# Patient Record
Sex: FEMALE | Race: OTHER | HISPANIC OR LATINO
[De-identification: names, ages, dates, MRNs, and addresses within clinical notes are randomized per-mention and may not be internally consistent; named-entity substitution may affect disease eponyms.]

---

## 2023-04-11 PROBLEM — Z00.00 ENCOUNTER FOR PREVENTIVE HEALTH EXAMINATION: Status: ACTIVE | Noted: 2023-04-11

## 2023-04-21 ENCOUNTER — APPOINTMENT (OUTPATIENT)
Dept: ENDOCRINOLOGY | Facility: CLINIC | Age: 58
End: 2023-04-21

## 2023-08-11 ENCOUNTER — APPOINTMENT (OUTPATIENT)
Dept: ENDOCRINOLOGY | Facility: CLINIC | Age: 58
End: 2023-08-11

## 2023-09-15 ENCOUNTER — APPOINTMENT (OUTPATIENT)
Dept: ENDOCRINOLOGY | Facility: CLINIC | Age: 58
End: 2023-09-15

## 2023-11-19 ENCOUNTER — NON-APPOINTMENT (OUTPATIENT)
Age: 58
End: 2023-11-19

## 2023-11-20 ENCOUNTER — APPOINTMENT (OUTPATIENT)
Dept: ENDOCRINOLOGY | Facility: CLINIC | Age: 58
End: 2023-11-20
Payer: MEDICAID

## 2023-11-20 VITALS
WEIGHT: 145 LBS | HEIGHT: 66 IN | SYSTOLIC BLOOD PRESSURE: 91 MMHG | HEART RATE: 100 BPM | BODY MASS INDEX: 23.3 KG/M2 | DIASTOLIC BLOOD PRESSURE: 56 MMHG | OXYGEN SATURATION: 100 %

## 2023-11-20 DIAGNOSIS — N76.0 ACUTE VAGINITIS: ICD-10-CM

## 2023-11-20 LAB — GLUCOSE BLDC GLUCOMTR-MCNC: 67

## 2023-11-20 PROCEDURE — 99205 OFFICE O/P NEW HI 60 MIN: CPT | Mod: 25

## 2023-11-20 PROCEDURE — 82962 GLUCOSE BLOOD TEST: CPT

## 2023-11-20 PROCEDURE — T1013A: CUSTOM

## 2023-11-20 RX ORDER — EMPAGLIFLOZIN 10 MG/1
10 TABLET, FILM COATED ORAL
Refills: 0 | Status: DISCONTINUED | COMMUNITY
End: 2023-11-20

## 2023-11-27 ENCOUNTER — LABORATORY RESULT (OUTPATIENT)
Age: 58
End: 2023-11-27

## 2023-11-30 LAB
CHOLEST SERPL-MCNC: 175 MG/DL
HDLC SERPL-MCNC: 56 MG/DL
LDLC SERPL CALC-MCNC: 98 MG/DL
NONHDLC SERPL-MCNC: 119 MG/DL
TRIGL SERPL-MCNC: 116 MG/DL

## 2023-12-04 ENCOUNTER — APPOINTMENT (OUTPATIENT)
Dept: ENDOCRINOLOGY | Facility: CLINIC | Age: 58
End: 2023-12-04
Payer: MEDICAID

## 2023-12-04 DIAGNOSIS — D17.1 BENIGN LIPOMATOUS NEOPLASM OF SKIN AND SUBCUTANEOUS TISSUE OF TRUNK: ICD-10-CM

## 2023-12-04 PROCEDURE — T1013A: CUSTOM

## 2023-12-04 PROCEDURE — 95251 CONT GLUC MNTR ANALYSIS I&R: CPT

## 2023-12-04 PROCEDURE — 99214 OFFICE O/P EST MOD 30 MIN: CPT | Mod: 25

## 2023-12-04 RX ORDER — FLUCONAZOLE 150 MG/1
150 TABLET ORAL
Qty: 1 | Refills: 0 | Status: DISCONTINUED | COMMUNITY
Start: 2023-11-20 | End: 2023-12-04

## 2023-12-14 ENCOUNTER — APPOINTMENT (OUTPATIENT)
Dept: ENDOCRINOLOGY | Facility: CLINIC | Age: 58
End: 2023-12-14

## 2023-12-18 ENCOUNTER — APPOINTMENT (OUTPATIENT)
Dept: ENDOCRINOLOGY | Facility: CLINIC | Age: 58
End: 2023-12-18
Payer: MEDICAID

## 2023-12-18 LAB — GLUCOSE BLDC GLUCOMTR-MCNC: 67

## 2023-12-18 PROCEDURE — 82962 GLUCOSE BLOOD TEST: CPT

## 2023-12-27 ENCOUNTER — APPOINTMENT (OUTPATIENT)
Dept: ENDOCRINOLOGY | Facility: CLINIC | Age: 58
End: 2023-12-27

## 2024-01-23 ENCOUNTER — NON-APPOINTMENT (OUTPATIENT)
Age: 59
End: 2024-01-23

## 2024-01-23 ENCOUNTER — APPOINTMENT (OUTPATIENT)
Dept: CARDIOLOGY | Facility: CLINIC | Age: 59
End: 2024-01-23
Payer: COMMERCIAL

## 2024-01-23 VITALS
WEIGHT: 152 LBS | HEART RATE: 82 BPM | BODY MASS INDEX: 24.53 KG/M2 | DIASTOLIC BLOOD PRESSURE: 64 MMHG | SYSTOLIC BLOOD PRESSURE: 117 MMHG | OXYGEN SATURATION: 99 %

## 2024-01-23 DIAGNOSIS — R00.2 PALPITATIONS: ICD-10-CM

## 2024-01-23 DIAGNOSIS — G62.9 POLYNEUROPATHY, UNSPECIFIED: ICD-10-CM

## 2024-01-23 PROCEDURE — 93000 ELECTROCARDIOGRAM COMPLETE: CPT

## 2024-01-23 PROCEDURE — 99204 OFFICE O/P NEW MOD 45 MIN: CPT | Mod: 25

## 2024-01-23 NOTE — HISTORY OF PRESENT ILLNESS
[FreeTextEntry1] : Patient denies any history of myocardial infarction or congestive heart failure or chest pain syndrome  Diabetes dx'ed 6 ya, treated but still uncontrolled  About 15 days ago, she developed sudden palpitations. lasting < 1 minute, w/o associated symptoms Had 2 more episodes following that first one but has not had any in the last week and a half  No CP, SOB No dizziness No HANS, PND or orthopnea  She doesn't sleep  well -. has been for years She doens't snore

## 2024-01-23 NOTE — REVIEW OF SYSTEMS
[Numbness (Hypoesthesia)] : numbness [Tingling (Paresthesia)] : tingling [Negative] : Heme/Lymph [FreeTextEntry5] : See HPI [FreeTextEntry2] : Sleeping difficulty [de-identified] : burning in feet

## 2024-01-23 NOTE — PHYSICAL EXAM
[Well Nourished] : well nourished [No Acute Distress] : no acute distress [Well Developed] : well developed [Normal Conjunctiva] : normal conjunctiva [Normal Venous Pressure] : normal venous pressure [No Carotid Bruit] : no carotid bruit [Normal S1, S2] : normal S1, S2 [No Murmur] : no murmur [No Rub] : no rub [No Gallop] : no gallop [Clear Lung Fields] : clear lung fields [Good Air Entry] : good air entry [Soft] : abdomen soft [No Respiratory Distress] : no respiratory distress  [No Masses/organomegaly] : no masses/organomegaly [Non Tender] : non-tender [Normal Bowel Sounds] : normal bowel sounds [Normal Gait] : normal gait [No Edema] : no edema [No Cyanosis] : no cyanosis [No Varicosities] : no varicosities [No Clubbing] : no clubbing [No Rash] : no rash [No Skin Lesions] : no skin lesions [Moves all extremities] : moves all extremities [Normal Speech] : normal speech [No Focal Deficits] : no focal deficits [Normal memory] : normal memory [Alert and Oriented] : alert and oriented

## 2024-02-09 ENCOUNTER — LABORATORY RESULT (OUTPATIENT)
Age: 59
End: 2024-02-09

## 2024-02-09 ENCOUNTER — APPOINTMENT (OUTPATIENT)
Dept: ENDOCRINOLOGY | Facility: CLINIC | Age: 59
End: 2024-02-09
Payer: COMMERCIAL

## 2024-02-09 VITALS
HEIGHT: 68 IN | BODY MASS INDEX: 22.73 KG/M2 | DIASTOLIC BLOOD PRESSURE: 71 MMHG | HEART RATE: 95 BPM | WEIGHT: 150 LBS | SYSTOLIC BLOOD PRESSURE: 109 MMHG | OXYGEN SATURATION: 98 %

## 2024-02-09 LAB — GLUCOSE BLDC GLUCOMTR-MCNC: 219

## 2024-02-09 PROCEDURE — 82962 GLUCOSE BLOOD TEST: CPT

## 2024-02-09 PROCEDURE — 99214 OFFICE O/P EST MOD 30 MIN: CPT | Mod: 25

## 2024-02-09 PROCEDURE — G2211 COMPLEX E/M VISIT ADD ON: CPT

## 2024-02-09 RX ORDER — GABAPENTIN 600 MG/1
600 TABLET, COATED ORAL TWICE DAILY
Qty: 180 | Refills: 1 | Status: ACTIVE | COMMUNITY
Start: 1900-01-01 | End: 1900-01-01

## 2024-02-09 RX ORDER — INSULIN GLARGINE 100 [IU]/ML
100 INJECTION, SOLUTION SUBCUTANEOUS DAILY
Qty: 3 | Refills: 1 | Status: ACTIVE | COMMUNITY
Start: 2023-11-20 | End: 1900-01-01

## 2024-02-09 RX ORDER — INSULIN ASPART 100 [IU]/ML
100 INJECTION, SOLUTION INTRAVENOUS; SUBCUTANEOUS
Qty: 16 | Refills: 1 | Status: ACTIVE | COMMUNITY
Start: 2023-11-20 | End: 1900-01-01

## 2024-02-09 RX ORDER — ALOGLIPTIN 25 MG/1
25 TABLET, FILM COATED ORAL DAILY
Qty: 90 | Refills: 0 | Status: ACTIVE | COMMUNITY
Start: 1900-01-01 | End: 1900-01-01

## 2024-02-09 NOTE — REASON FOR VISIT
[Follow - Up] : a follow-up visit [DM Type 2] : DM Type 2 [Other: ______] : provided by NAUN [Time Spent: ____ minutes] : Total time spent using  services: [unfilled] minutes. The patient's primary language is not English thus required  services. [Source: ______] : History obtained from CHERY [Interpreters_IDNumber] : [id number] [Interpreters_FullName] : Sophie [TWNoteComboBox1] : [place]

## 2024-02-21 ENCOUNTER — APPOINTMENT (OUTPATIENT)
Dept: CARDIOLOGY | Facility: CLINIC | Age: 59
End: 2024-02-21

## 2024-04-02 ENCOUNTER — APPOINTMENT (OUTPATIENT)
Dept: CARDIOLOGY | Facility: CLINIC | Age: 59
End: 2024-04-02

## 2024-04-09 ENCOUNTER — APPOINTMENT (OUTPATIENT)
Dept: CARDIOLOGY | Facility: CLINIC | Age: 59
End: 2024-04-09

## 2024-04-10 ENCOUNTER — APPOINTMENT (OUTPATIENT)
Dept: ENDOCRINOLOGY | Facility: CLINIC | Age: 59
End: 2024-04-10
Payer: COMMERCIAL

## 2024-04-10 VITALS
SYSTOLIC BLOOD PRESSURE: 120 MMHG | DIASTOLIC BLOOD PRESSURE: 78 MMHG | BODY MASS INDEX: 24.94 KG/M2 | WEIGHT: 164 LBS | HEART RATE: 92 BPM

## 2024-04-10 DIAGNOSIS — Z79.4 TYPE 2 DIABETES MELLITUS WITH DIABETIC POLYNEUROPATHY: ICD-10-CM

## 2024-04-10 DIAGNOSIS — E11.42 TYPE 2 DIABETES MELLITUS WITH DIABETIC POLYNEUROPATHY: ICD-10-CM

## 2024-04-10 DIAGNOSIS — E78.5 HYPERLIPIDEMIA, UNSPECIFIED: ICD-10-CM

## 2024-04-10 LAB
GLUCOSE BLDC GLUCOMTR-MCNC: 59
GLUCOSE BLDC GLUCOMTR-MCNC: 85
HBA1C MFR BLD HPLC: 9.8

## 2024-04-10 PROCEDURE — 83036 HEMOGLOBIN GLYCOSYLATED A1C: CPT | Mod: QW

## 2024-04-10 PROCEDURE — 82962 GLUCOSE BLOOD TEST: CPT

## 2024-04-10 PROCEDURE — 99204 OFFICE O/P NEW MOD 45 MIN: CPT | Mod: 25

## 2024-04-10 RX ORDER — LANCETS 33 GAUGE
EACH MISCELLANEOUS
Qty: 4 | Refills: 3 | Status: ACTIVE | COMMUNITY
Start: 2023-12-04 | End: 1900-01-01

## 2024-04-10 RX ORDER — DULAGLUTIDE 0.75 MG/.5ML
0.75 INJECTION, SOLUTION SUBCUTANEOUS
Qty: 1 | Refills: 0 | Status: COMPLETED | COMMUNITY
Start: 2024-04-10 | End: 2024-05-10

## 2024-04-10 RX ORDER — ATORVASTATIN CALCIUM 20 MG/1
20 TABLET, FILM COATED ORAL
Qty: 90 | Refills: 3 | Status: ACTIVE | COMMUNITY

## 2024-04-10 RX ORDER — BLOOD-GLUCOSE METER
W/DEVICE KIT MISCELLANEOUS
Qty: 1 | Refills: 0 | Status: ACTIVE | COMMUNITY
Start: 2023-12-04 | End: 2024-07-09

## 2024-04-10 RX ORDER — BLOOD-GLUCOSE METER
KIT MISCELLANEOUS 4 TIMES DAILY
Qty: 4 | Refills: 3 | Status: ACTIVE | COMMUNITY
Start: 2023-12-04 | End: 1900-01-01

## 2024-04-10 RX ORDER — FLASH GLUCOSE SENSOR
KIT MISCELLANEOUS
Qty: 6 | Refills: 1 | Status: ACTIVE | COMMUNITY
Start: 2023-11-20 | End: 1900-01-01

## 2024-04-10 RX ORDER — FLASH GLUCOSE SCANNING READER
EACH MISCELLANEOUS
Qty: 1 | Refills: 0 | Status: ACTIVE | COMMUNITY
Start: 2023-11-20 | End: 2024-07-09

## 2024-04-10 NOTE — ASSESSMENT
[FreeTextEntry1] : Type 2 diabetes mellitus. Point-of-care HbA1c 9.8% and glucose 59->85 mg/dL (juice) today. Nephropathy. Neuropathy. We discussed the cardiovascular and microvascular complications of uncontrolled diabetes. We discussed the importance of diet and exercise and lifestyle modification for glycemic control. We discussed referral to nutrition. We discussed pharmacologic options for glycemic control. She is amenable to a trial of a GLP-1 receptor agonist. We discussed the risks and benefits of the GLP-1 receptor agonist class, including but not limited to nausea, pancreatitis, medullary thyroid cancer. We reviewed subcutaneous injection technique, storage, and sharps disposal. We can consider monitoring C-peptide next visit if glycemic control is not improved.  Continue Basaglar 40 units in the morning Start Trulicity 0.75 mg weekly for four weeks, then 1.5 mg weekly if covered by insurance Adjust Aspart to 10 units before meals when starting Trulicity 0.75 mg weekly, and adjust to 5 units before meals when adjusting Trulicity to 1.5 mg weekly Stop alogliptin 25 mg daily when starting Trulicity Restart the FreeStyle Jero continuous glucose monitor; reviewed glycemic goals and management of hypoglycemia She is not on a blood pressure regimen; blood pressure around goal She is on a statin for cholesterol; last lipid panel around goal Nephropathy screening: Urine microalbumin elevated in November 2023; monitor with improved glycemic control Last ophthalmology appointment: Over a year; upcoming appointment Last podiatry appointment: March 2024 Last dental appointment: Over six months  Return to see Hermila Duncan NP in 2 months. Patient advised to call earlier with significant hypo- or hyperglycemia.

## 2024-04-10 NOTE — HISTORY OF PRESENT ILLNESS
[FreeTextEntry1] : Ms. Britton Rocha is a 59 year-old woman with a history of type 2 diabetes mellitus and hyperlipidemia presenting to establish care with me for type 2 diabetes mellitus. She is accompanied by her son. She is transitioning care from Dr. Nate Ureña after a recent move to the [place]. The visit was conducted in Slovak.   Type 2 diabetes mellitus. Point-of-care HbA1c 9.8% and glucose 59->85 mg/dL (juice) today; HbA1c 11.1% in February 2024. Nephropathy. Neuropathy.  She was diagnosed with diabetes in approximately 2018. No hospitalizations for hypo- or hyperglycemia. She is currently taking Basaglar 40 units in the morning, Aspart 18 units before meals, and alogliptin 25 mg daily. She did not tolerate Jardiance. She has not tried a GLP-1 receptor agonist therapy in the past.  She is not on a blood pressure regimen She is on a statin for cholesterol Nephropathy screening: Urine microalbumin elevated in November 2023 Last ophthalmology appointment: Over a year; upcoming appointment Last podiatry appointment: March 2024 Last dental appointment: Over six months  She has chronic stable lower extremity numbness/tingling. No chest pain or shortness of breath. She has had sleep disturbance. She has a carbohydrate rich diet. No juices/soda/sweetened beverages.

## 2024-04-10 NOTE — PHYSICAL EXAM
[Alert] : alert [Healthy Appearance] : healthy appearance [No Acute Distress] : no acute distress [Normal Sclera/Conjunctiva] : normal sclera/conjunctiva [Normal Hearing] : hearing was normal [No Neck Mass] : no neck mass was observed [No LAD] : no lymphadenopathy [Supple] : the neck was supple [Thyroid Not Enlarged] : the thyroid was not enlarged [No Respiratory Distress] : no respiratory distress [Clear to Auscultation] : lungs were clear to auscultation bilaterally [Normal S1, S2] : normal S1 and S2 [Normal Rate] : heart rate was normal [Regular Rhythm] : with a regular rhythm [No Stigmata of Cushings Syndrome] : no stigmata of Cushings Syndrome [Normal Gait] : normal gait [Normal Insight/Judgement] : insight and judgment were intact [Kyphosis] : no kyphosis present [Acanthosis Nigricans] : no acanthosis nigricans [de-identified] : no moon facies, no supraclavicular fat pads

## 2024-05-24 ENCOUNTER — APPOINTMENT (OUTPATIENT)
Dept: ENDOCRINOLOGY | Facility: CLINIC | Age: 59
End: 2024-05-24

## 2024-06-06 ENCOUNTER — APPOINTMENT (OUTPATIENT)
Dept: ENDOCRINOLOGY | Facility: CLINIC | Age: 59
End: 2024-06-06

## 2024-06-06 NOTE — HISTORY OF PRESENT ILLNESS
[FreeTextEntry1] : PAMELA TANG is a 59 year old female with pmhx of T2D, HLD who presents for T2D follow-up.   Patient of Dr. Carcamo, last visit, 4/10/24  Encounter conducted in patients preferred language, [place]ian, with pacific : ***, ID # ***.   Interval change: Since last visit, initiated Trulicity 0.75mg/weekly and titrated to 1.5mg/weekly after 4 weeks With initiation of Trulicity, d/ericka alogliptin and decreased prandial insulin to 5 units before meals   A1C - ***% from *** Office Fingerstick -- *** (not fasting)   Current medication: - Basaglar 40 units daily - insulin aspart 5 units before meals - Trulicity ***mg/weekly   Past medication: - alogliptin 25mg daily   PAMELA reports they take their diabetes medication *** of the time. PAMELA denies/endorses hypoglycemia symptoms or BG <70   Diabetes Self-Management: Glucometer: *** Monitoring BG *** daily Glucometer downloaded, reviewed, scanned into media --average BG: --fasting BG range:   Diet-- Typically consumes *** meals/daily, +/- snacks Beverages:     Ophthalmology: [facility], spring 2024 Podiatry: [facility], last visit March 2024   ***Denies personal history of heart disease. ***Denies personal history of renal disease.

## 2024-06-17 RX ORDER — DULAGLUTIDE 1.5 MG/.5ML
1.5 INJECTION, SOLUTION SUBCUTANEOUS
Qty: 1 | Refills: 5 | Status: ACTIVE | COMMUNITY
Start: 2024-04-10 | End: 1900-01-01